# Patient Record
Sex: FEMALE | Race: WHITE
[De-identification: names, ages, dates, MRNs, and addresses within clinical notes are randomized per-mention and may not be internally consistent; named-entity substitution may affect disease eponyms.]

---

## 2021-03-13 ENCOUNTER — HOSPITAL ENCOUNTER (OUTPATIENT)
Dept: HOSPITAL 53 - M LABSMTC | Age: 26
End: 2021-03-13
Attending: ANESTHESIOLOGY
Payer: COMMERCIAL

## 2021-03-13 DIAGNOSIS — Z20.822: ICD-10-CM

## 2021-03-13 DIAGNOSIS — Z01.812: Primary | ICD-10-CM

## 2021-03-18 ENCOUNTER — HOSPITAL ENCOUNTER (OUTPATIENT)
Dept: HOSPITAL 53 - M SDC | Age: 26
Discharge: HOME | End: 2021-03-18
Attending: OTOLARYNGOLOGY
Payer: COMMERCIAL

## 2021-03-18 VITALS — HEIGHT: 65 IN | BODY MASS INDEX: 21.96 KG/M2 | WEIGHT: 131.8 LBS

## 2021-03-18 VITALS — DIASTOLIC BLOOD PRESSURE: 74 MMHG | SYSTOLIC BLOOD PRESSURE: 127 MMHG

## 2021-03-18 DIAGNOSIS — T88.59XD: ICD-10-CM

## 2021-03-18 DIAGNOSIS — Z79.899: ICD-10-CM

## 2021-03-18 DIAGNOSIS — J35.01: Primary | ICD-10-CM

## 2021-03-18 DIAGNOSIS — Z88.9: ICD-10-CM

## 2021-03-18 DIAGNOSIS — I45.6: ICD-10-CM

## 2021-03-18 DIAGNOSIS — Z86.14: ICD-10-CM

## 2021-03-18 PROCEDURE — 88302 TISSUE EXAM BY PATHOLOGIST: CPT

## 2021-03-18 PROCEDURE — 42826 REMOVAL OF TONSILS: CPT

## 2021-03-18 PROCEDURE — 81025 URINE PREGNANCY TEST: CPT

## 2021-03-18 RX ADMIN — FENTANYL CITRATE PRN MCG: 50 INJECTION, SOLUTION INTRAMUSCULAR; INTRAVENOUS at 15:58

## 2021-03-18 RX ADMIN — FENTANYL CITRATE PRN MCG: 50 INJECTION, SOLUTION INTRAMUSCULAR; INTRAVENOUS at 16:03

## 2021-03-23 NOTE — RO
OPERATIVE NOTE



DATE OF OPERATION: 03/18/2021



PREOPERATIVE DIAGNOSIS: Chronic tonsillitis.



POSTOPERATIVE DIAGNOSIS: Chronic tonsillitis.



PROCEDURE PERFORMED: Tonsillectomy.



SURGEON: Jonathan Douglas MD.



ASSISTANT:



ANESTHESIA: General. 



CLINICAL PREAMBLE: This 25-year-old woman presented to the office with history

of chronic tonsillitis. Physical examination revealed cryptic tonsils.

Management options, including surgery listed above, have been discussed. The

patient understood and consented to the procedure. 



DESCRIPTION OF PROCEDURE: Patient was identified in preoperative holding and

brought to the operating room in stable condition. In supine position on the

operating table, patient received general anesthesia followed by orotracheal

intubation without incident. Patient was prepped and draped in the usual

fashion for the procedure. The Margarette-Jericho mouth gag was inserted and

suspended. 



The right tonsil was medialized using curved Allis forceps. Mucosal incision

was made over the superior pole of the right tonsil using the Coblator wand set

at 7 for Coblation. The tonsillar capsule was identified, and dissection was

carried out along the plane to excise the right tonsil. The left tonsil was

then similarly dissected out. At the end of the procedure, both tonsil beds

were free of bleeding. 



Estimated blood loss was less than 10 mL. No complication was encountered.

Sponge and instrument counts were correct at the end of the procedure. General

anesthesia was reversed, and the patient was extubated and brought to the

recovery room in stable condition.

## 2021-06-03 NOTE — ROOPDOC
[facility] Report Of Operation


Report of Operation


DATE OF PROCEDURE: 2020





PREPROCEDURE DIAGNOSES: First trimester miscarriage, missed .





POSTPROCEDURE DIAGNOSES: Same.





PROCEDURE: Suction D&C. 





SURGEON: Krissy Sotelo DO FACOG





ASSISTANT: none





ANESTHESIA: General via LMA





ESTIMATED BLOOD LOSS: Approximately 800 mL. 


IV FLUIDS REPLACED: 500 mL LR


UOP: in and out cath, 25 mL





COMPLICATIONS: none. 





SPECIMENS: products of conception, intrauterine tissue.





PREOPERATIVE / PROPHYLACTIC ANTIBIOTIC: Doxycycline 100mg IV x1. 





INTRAOPERATIVE FINDINGS/REMARKS:. Initially she had very brisk bleeding. Thus, 

the EBL. Bleeding was eventually controlled with continued curettage and 

suction, products of conception visualized grossly. She was given Methergine IM 

0.2 mg 1 for control of bleeding and she responded well to this medication. 

Minimal bleeding from the os was noted at the conclusion of the procedure and 

her vitals were normal and stable.. She will receive an additional dose of 

Methergine 0.2 mg by mouth during her postoperative recovery.  Uterus was 

retroverted, retroflexed, about 10 cm in longitudinal dimension/uterine sound 

done.





DESCRIPTION OF PROCEDURE:


The patient was counseled, consented on the risks, benefits, indications and 

alternatives procedure. Informed consent was obtained. She was taken to the 

operating room with an IV running and placed on the operating table in dorsal 

supine position. Gen. anesthesia was administered and the airway was secured 

without any difficulty. She was prepared and draped in the normal sterile 

fashion.  She was placed in the high lithotomy position. A time out was 

performed per protocol.  The bladder was drained with a sterile in and out 

catheter.  Sterile speculum was placed with good visualization of the cervix.  

The cervix was grasped with a single-tooth tenaculum at the anterior lip and 

downward traction was applied.  The cervix was sequentially dilated with Alvaro 

dilators up to a #20.  A size 9 curved Vacurette was placed trans-cervically 

into the intrauterine cavity.  Suction was activated.  Tissue and blood return 

was consistent with products of conception.  After removal of the Vacurette, a 

sharp curettage was performed with minimal tissue and blood return.  Minimal 

bleeding from the cervical os was noted.  The patient's vitals were normal and 

stable.  The decision was made to conclude the procedure.  Single-tooth 

tenaculum was removed from the cervix and the tenaculum sites were noted to be 

hemostatic. Again, minimal bleeding from the cervical os was noted.  All 

instruments were removed from the vagina.  Sponge and instrument counts were 

correct per protocol.  The patient was transferred to the PACU in good and 

stable condition.  





Krissy Sotelo DO FACOG.











KRISSY SOTELO DO            Tyrell 3, 2021 11:49